# Patient Record
Sex: FEMALE | Race: WHITE | NOT HISPANIC OR LATINO | URBAN - METROPOLITAN AREA
[De-identification: names, ages, dates, MRNs, and addresses within clinical notes are randomized per-mention and may not be internally consistent; named-entity substitution may affect disease eponyms.]

---
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
## 2022-09-24 NOTE — ED BEHAVIORAL HEALTH ASSESSMENT NOTE - NS ED B[facility] PLAN HOLD IN ED BH CONTACTED FT
MEDHAT Vuong MD w/ Critical Care at pt bedside. Pt updated on POC at this time.    Angela (RN) and Ольга (counselor)

## 2024-04-07 NOTE — ED PROVIDER NOTE - SECONDARY DIAGNOSIS.
PROCEDURES:  Laparoscopic cholecystectomy 07-Apr-2024 13:44:40  Manuel Rascon   Alcohol use disorder, mild, abuse Cannabis use disorder, mild, abuse Depression, unspecified depression type

## 2024-04-25 NOTE — ED PEDIATRIC NURSE REASSESSMENT NOTE - NS ED NURSE REASSESS COMMENT FT2
Patient calm and cooperative at this time. VSS. Pending bed assignment for psych admission. 1:1 in progress for safety. Care provided to laceration to left arm, dressing in place, no active bleeding noted. Will continue monitoring patient.
Pr lacerations on left arm cleaned new dressing applied. Pt calm & cooperative, will continue to monitor
Pt wanded by security, valuables envelope given to  at bedside.
Previously Negative (within the last year)

## 2024-11-14 NOTE — ED BEHAVIORAL HEALTH ASSESSMENT NOTE - NS ED [facility] MED ROS SKIN
"Received critical potassium value of 2.6. Dr Philip notified and received TORB. Ordered pt to go to his local ER for IV potassium replacement and to have patient start oral Kcl 40 meq daily for 7 days and repeat a potassium level tomorrow. Stop sodium bicarbonate and any diuretics.      Notified pt of low potassium and explained symptoms.  Patient reports minor diarrhea this morning and "a little lightheaded".  Pt verbalized understanding of above instructions and will go to his local ER now.  " No complaints